# Patient Record
Sex: FEMALE | Race: WHITE | URBAN - METROPOLITAN AREA
[De-identification: names, ages, dates, MRNs, and addresses within clinical notes are randomized per-mention and may not be internally consistent; named-entity substitution may affect disease eponyms.]

---

## 2018-11-12 ENCOUNTER — OFFICE VISIT (OUTPATIENT)
Dept: URBAN - METROPOLITAN AREA CLINIC 29 | Facility: CLINIC | Age: 23
End: 2018-11-12
Payer: COMMERCIAL

## 2018-11-12 DIAGNOSIS — H52.13 MYOPIA, BILATERAL: Primary | ICD-10-CM

## 2018-11-12 PROCEDURE — 92004 COMPRE OPH EXAM NEW PT 1/>: CPT | Performed by: OPTOMETRIST

## 2018-11-12 ASSESSMENT — INTRAOCULAR PRESSURE
OS: 16
OD: 16

## 2018-11-12 ASSESSMENT — VISUAL ACUITY
OD: 20/20
OS: 20/20

## 2018-11-12 NOTE — IMPRESSION/PLAN
Impression: Myopia, bilateral: H52.13. OU. Plan: Refractive error accounts for symptoms. Release SRX. 
RTC 1 year x complete exam.